# Patient Record
Sex: MALE | Race: WHITE | NOT HISPANIC OR LATINO | URBAN - METROPOLITAN AREA
[De-identification: names, ages, dates, MRNs, and addresses within clinical notes are randomized per-mention and may not be internally consistent; named-entity substitution may affect disease eponyms.]

---

## 2023-08-24 PROBLEM — M35.9 SYSTEMIC INVOLVEMENT OF CONNECTIVE TISSUE, UNSPECIFIED (MULTI): Status: ACTIVE | Noted: 2023-08-24

## 2023-08-24 PROBLEM — R79.89 ELEVATED LIVER FUNCTION TESTS: Status: ACTIVE | Noted: 2023-08-24

## 2023-08-24 PROBLEM — M77.42 METATARSALGIA, LEFT FOOT: Status: ACTIVE | Noted: 2023-08-24

## 2023-08-24 PROBLEM — G58.9 NERVE ENTRAPMENT SYNDROME: Status: ACTIVE | Noted: 2023-08-24

## 2023-08-24 PROBLEM — M77.41 METATARSALGIA, RIGHT FOOT: Status: ACTIVE | Noted: 2023-08-24

## 2023-08-24 PROBLEM — K75.81 NASH (NONALCOHOLIC STEATOHEPATITIS): Status: ACTIVE | Noted: 2023-08-24

## 2023-08-24 PROBLEM — D69.6 THROMBOCYTOPENIA (CMS-HCC): Status: ACTIVE | Noted: 2023-08-24

## 2023-08-24 PROBLEM — M32.9 SYSTEMIC LUPUS ERYTHEMATOSUS (SLE) IN ADULT (MULTI): Status: ACTIVE | Noted: 2023-08-24

## 2023-08-24 PROBLEM — R76.0 ANTINUCLEAR ANTIBODY (ANA) TITER GREATER THAN 1:80: Status: ACTIVE | Noted: 2023-08-24

## 2023-08-24 PROBLEM — D84.1 DEFECTS IN THE COMPLEMENT SYSTEM (MULTI): Status: ACTIVE | Noted: 2023-08-24

## 2023-08-24 PROBLEM — E79.0 HYPERURICEMIA: Status: ACTIVE | Noted: 2023-08-24

## 2023-08-24 PROBLEM — D72.819: Status: ACTIVE | Noted: 2023-08-24

## 2023-08-24 RX ORDER — LOSARTAN POTASSIUM AND HYDROCHLOROTHIAZIDE 12.5; 5 MG/1; MG/1
1 TABLET ORAL DAILY
COMMUNITY

## 2023-10-03 ENCOUNTER — TELEPHONE (OUTPATIENT)
Dept: RHEUMATOLOGY | Facility: CLINIC | Age: 48
End: 2023-10-03
Payer: COMMERCIAL

## 2023-10-03 NOTE — TELEPHONE ENCOUNTER
PT'S WIFE LEFT  STATING INS DENIED MRI OF LIVER THAT PT WAS TO HAVE PRIOR TO APPT ON 10/18/23. LAST VISIT WAS 7/13/23. WILL HAVE NEW INS OF 1/1/24 & WILL ATTEMPT TO GET MRI AFTER THAT. DOES PT NEED TO R/S OCTOBER APPT UNTIL MARCH OR APRIL?

## 2023-10-11 NOTE — TELEPHONE ENCOUNTER
LEFT VM ON HOME PHONE FOR PT TO DECIDE  IF HE WANTS F/U WITH DR ELLIOTT & SHOULD  F/U ON LIVER DISORDER WITH DR HUNT

## 2023-10-11 NOTE — TELEPHONE ENCOUNTER
The patient does not need to return here for the liver disorder. This should be handled through Dr. Capone's office. Please see my last note as to when he should follow-up with me. Dr GRIFFIN

## 2023-10-18 ENCOUNTER — APPOINTMENT (OUTPATIENT)
Dept: RHEUMATOLOGY | Facility: CLINIC | Age: 48
End: 2023-10-18
Payer: COMMERCIAL

## 2023-12-06 ENCOUNTER — ANCILLARY PROCEDURE (OUTPATIENT)
Dept: RADIOLOGY | Facility: CLINIC | Age: 48
End: 2023-12-06
Payer: COMMERCIAL

## 2023-12-06 DIAGNOSIS — I10 ESSENTIAL (PRIMARY) HYPERTENSION: ICD-10-CM

## 2023-12-06 PROCEDURE — 75571 CT HRT W/O DYE W/CA TEST: CPT | Mod: LIO

## 2024-01-26 ENCOUNTER — LAB (OUTPATIENT)
Dept: LAB | Facility: LAB | Age: 49
End: 2024-01-26
Payer: COMMERCIAL

## 2024-01-26 DIAGNOSIS — I10 ESSENTIAL (PRIMARY) HYPERTENSION: ICD-10-CM

## 2024-01-26 DIAGNOSIS — I25.10 ATHEROSCLEROTIC HEART DISEASE OF NATIVE CORONARY ARTERY WITHOUT ANGINA PECTORIS: Primary | ICD-10-CM

## 2024-01-26 LAB
ALBUMIN SERPL-MCNC: 4.8 G/DL (ref 3.5–5)
ALP BLD-CCNC: 83 U/L (ref 35–125)
ALT SERPL-CCNC: 83 U/L (ref 5–40)
ANION GAP SERPL CALC-SCNC: 11 MMOL/L
AST SERPL-CCNC: 71 U/L (ref 5–40)
BILIRUB SERPL-MCNC: 0.6 MG/DL (ref 0.1–1.2)
BUN SERPL-MCNC: 22 MG/DL (ref 8–25)
CALCIUM SERPL-MCNC: 9.7 MG/DL (ref 8.5–10.4)
CHLORIDE SERPL-SCNC: 100 MMOL/L (ref 97–107)
CHOLEST SERPL-MCNC: 155 MG/DL (ref 133–200)
CHOLEST/HDLC SERPL: 3.9 {RATIO}
CO2 SERPL-SCNC: 26 MMOL/L (ref 24–31)
CREAT SERPL-MCNC: 1 MG/DL (ref 0.4–1.6)
EGFRCR SERPLBLD CKD-EPI 2021: >90 ML/MIN/1.73M*2
GLUCOSE SERPL-MCNC: 114 MG/DL (ref 65–99)
HDLC SERPL-MCNC: 40 MG/DL
LDLC SERPL CALC-MCNC: 97 MG/DL (ref 65–130)
POTASSIUM SERPL-SCNC: 4.6 MMOL/L (ref 3.4–5.1)
PROT SERPL-MCNC: 7.9 G/DL (ref 5.9–7.9)
PSA SERPL-MCNC: 0.1 NG/ML
SODIUM SERPL-SCNC: 137 MMOL/L (ref 133–145)
TRIGL SERPL-MCNC: 88 MG/DL (ref 40–150)

## 2024-01-26 PROCEDURE — 80053 COMPREHEN METABOLIC PANEL: CPT

## 2024-01-26 PROCEDURE — 80061 LIPID PANEL: CPT

## 2024-01-26 PROCEDURE — 84153 ASSAY OF PSA TOTAL: CPT

## 2024-01-26 PROCEDURE — 36415 COLL VENOUS BLD VENIPUNCTURE: CPT

## 2025-01-06 ENCOUNTER — OFFICE VISIT (OUTPATIENT)
Dept: CARDIOLOGY | Facility: CLINIC | Age: 50
End: 2025-01-06
Payer: COMMERCIAL

## 2025-01-06 VITALS
SYSTOLIC BLOOD PRESSURE: 160 MMHG | BODY MASS INDEX: 32.36 KG/M2 | OXYGEN SATURATION: 98 % | WEIGHT: 232 LBS | HEART RATE: 80 BPM | DIASTOLIC BLOOD PRESSURE: 82 MMHG

## 2025-01-06 DIAGNOSIS — I25.10 ASHD (ARTERIOSCLEROTIC HEART DISEASE): Primary | ICD-10-CM

## 2025-01-06 DIAGNOSIS — I10 PRIMARY HYPERTENSION: ICD-10-CM

## 2025-01-06 PROBLEM — E78.49 OTHER HYPERLIPIDEMIA: Status: ACTIVE | Noted: 2025-01-06

## 2025-01-06 PROCEDURE — 99213 OFFICE O/P EST LOW 20 MIN: CPT | Performed by: INTERNAL MEDICINE

## 2025-01-06 PROCEDURE — 3079F DIAST BP 80-89 MM HG: CPT | Performed by: INTERNAL MEDICINE

## 2025-01-06 PROCEDURE — 99203 OFFICE O/P NEW LOW 30 MIN: CPT | Performed by: INTERNAL MEDICINE

## 2025-01-06 PROCEDURE — 1036F TOBACCO NON-USER: CPT | Performed by: INTERNAL MEDICINE

## 2025-01-06 PROCEDURE — 3077F SYST BP >= 140 MM HG: CPT | Performed by: INTERNAL MEDICINE

## 2025-01-06 RX ORDER — BEMPEDOIC ACID 180 MG/1
180 TABLET, FILM COATED ORAL DAILY
COMMUNITY

## 2025-01-06 RX ORDER — ATENOLOL 50 MG/1
50 TABLET ORAL DAILY
COMMUNITY

## 2025-01-06 RX ORDER — LOSARTAN POTASSIUM AND HYDROCHLOROTHIAZIDE 12.5; 1 MG/1; MG/1
1 TABLET ORAL DAILY
Qty: 30 TABLET | Refills: 11 | Status: SHIPPED | OUTPATIENT
Start: 2025-01-06 | End: 2026-01-06

## 2025-01-06 ASSESSMENT — ENCOUNTER SYMPTOMS
ORTHOPNEA: 0
IRREGULAR HEARTBEAT: 0
WEAKNESS: 0
SHORTNESS OF BREATH: 0
DIAPHORESIS: 0
PND: 0
WEIGHT GAIN: 0
LOSS OF SENSATION IN FEET: 0
CLAUDICATION: 0
WEIGHT LOSS: 0
COUGH: 0
PALPITATIONS: 0
DYSPNEA ON EXERTION: 0
WHEEZING: 0
NEAR-SYNCOPE: 0
OCCASIONAL FEELINGS OF UNSTEADINESS: 0
DIZZINESS: 0
FEVER: 0
MYALGIAS: 0
DEPRESSION: 0
SYNCOPE: 0

## 2025-01-06 ASSESSMENT — PATIENT HEALTH QUESTIONNAIRE - PHQ9
1. LITTLE INTEREST OR PLEASURE IN DOING THINGS: NOT AT ALL
SUM OF ALL RESPONSES TO PHQ9 QUESTIONS 1 AND 2: 0
2. FEELING DOWN, DEPRESSED OR HOPELESS: NOT AT ALL

## 2025-01-06 ASSESSMENT — PAIN SCALES - GENERAL: PAINLEVEL_OUTOF10: 0-NO PAIN

## 2025-01-06 NOTE — ASSESSMENT & PLAN NOTE
148/95 on repeat check. Still elevated. Discussed salt restriction. Increasing Hyzaar to 100mg-12.5mg. Checking BMP. RTC or to Dr Chawla in 3 months. Advised to check home Bps 2-3x/week

## 2025-01-06 NOTE — PROGRESS NOTES
Subjective      No chief complaint on file.       49-year-old male presents for cardiac evaluation.  He was a former patient of Dr. Lu.  He has a coronary artery calcium score performed in December 2023 of [facility].  As such she has been treated for presumed coronary artery disease, not with a statin but with bempedoic acid. He has no issues with chest pain or dyspnea. He is active, works in construction. He does not exercise per se. He doesn't add salt. He does not check his BP at home         Review of Systems   Constitutional: Negative for diaphoresis, fever, weight gain and weight loss.   Eyes:  Negative for visual disturbance.   Cardiovascular:  Negative for chest pain, claudication, dyspnea on exertion, irregular heartbeat, leg swelling, near-syncope, orthopnea, palpitations, paroxysmal nocturnal dyspnea and syncope.   Respiratory:  Negative for cough, shortness of breath and wheezing.    Musculoskeletal:  Negative for muscle weakness and myalgias.   Neurological:  Negative for dizziness and weakness.   All other systems reviewed and are negative.       Past Medical History:   Diagnosis Date    Hypertension         History reviewed. No pertinent surgical history.     Social History     Socioeconomic History    Marital status:      Spouse name: Not on file    Number of children: Not on file    Years of education: Not on file    Highest education level: Not on file   Occupational History    Not on file   Tobacco Use    Smoking status: Never    Smokeless tobacco: Never   Substance and Sexual Activity    Alcohol use: Yes     Alcohol/week: 2.0 standard drinks of alcohol     Types: 2 Cans of beer per week     Comment: Daily    Drug use: Never    Sexual activity: Defer   Other Topics Concern    Not on file   Social History Narrative    Not on file     Social Drivers of Health     Financial Resource Strain: Not on file   Food Insecurity: Not on file   Transportation Needs: Not on file   Physical Activity: Not  on file   Stress: Not on file   Social Connections: Not on file   Intimate Partner Violence: Not on file   Housing Stability: Not on file        Family History   Problem Relation Name Age of Onset    No Known Problems Father      No Known Problems Sister      Diabetes type II Sister      No Known Problems Son      No Known Problems Son          OBJECTIVE:    Vitals:    01/06/25 0909   BP: 160/82   Pulse: 80   SpO2: 98%        Vitals reviewed.   Constitutional:       Appearance: Normal and healthy appearance. Not in distress.   Pulmonary:      Effort: Pulmonary effort is normal.      Breath sounds: Normal breath sounds.   Cardiovascular:      Normal rate. Regular rhythm. Normal S1. Normal S2.       Murmurs: There is no murmur.      No gallop.  No click.   Pulses:     Intact distal pulses.   Edema:     Peripheral edema absent.   Skin:     General: Skin is warm and dry.   Neurological:      General: No focal deficit present.          Lab Review:   Lab Results   Component Value Date     01/26/2024    K 4.6 01/26/2024     01/26/2024    CO2 26 01/26/2024    BUN 22 01/26/2024    CREATININE 1.00 01/26/2024    GLUCOSE 114 (H) 01/26/2024    CALCIUM 9.7 01/26/2024     Lab Results   Component Value Date    CHOL 155 01/26/2024    TRIG 88 01/26/2024    HDL 40.0 (L) 01/26/2024       Lab Results   Component Value Date    LDLCALC 97 01/26/2024        ASHD (arteriosclerotic heart disease)  Presumed based on CACS between 100-400. On nexletol due to persistent transaminitis. I suppose this is reasonable. Lipids with Dr Chawla soon    Primary hypertension  148/95 on repeat check. Still elevated. Discussed salt restriction. Increasing Hyzaar to 100mg-12.5mg. Checking BMP. RTC or to Dr Chawla in 3 months. Advised to check home Bps 2-3x/week

## 2025-01-06 NOTE — ASSESSMENT & PLAN NOTE
Presumed based on CACS between 100-400. On nexletol due to persistent transaminitis. I suppose this is reasonable. Lipids with Dr Denton cartwright